# Patient Record
Sex: FEMALE | Race: NATIVE HAWAIIAN OR OTHER PACIFIC ISLANDER
[De-identification: names, ages, dates, MRNs, and addresses within clinical notes are randomized per-mention and may not be internally consistent; named-entity substitution may affect disease eponyms.]

---

## 2015-06-11 VITALS
DIASTOLIC BLOOD PRESSURE: 66 MMHG | SYSTOLIC BLOOD PRESSURE: 139 MMHG | SYSTOLIC BLOOD PRESSURE: 139 MMHG | DIASTOLIC BLOOD PRESSURE: 66 MMHG

## 2017-06-21 ENCOUNTER — HOSPITAL ENCOUNTER (OUTPATIENT)
Dept: HOSPITAL 67 - LABW | Age: 77
Discharge: HOME | End: 2017-06-21
Attending: INTERNAL MEDICINE
Payer: COMMERCIAL

## 2017-06-21 DIAGNOSIS — E78.4: Primary | ICD-10-CM

## 2017-06-21 LAB
LDLC SERPL-MCNC: 60 MG/DL (ref 0–?)
PLATELET # BLD: 192 K/UL (ref 152–353)
POTASSIUM SERPL-SCNC: 3.8 MMOL/L (ref 3.6–5.2)
SODIUM SERPL-SCNC: 139 MMOL/L (ref 136–145)

## 2017-06-21 PROCEDURE — 84443 ASSAY THYROID STIM HORMONE: CPT

## 2017-06-21 PROCEDURE — 81000 URINALYSIS NONAUTO W/SCOPE: CPT

## 2017-06-21 PROCEDURE — 80061 LIPID PANEL: CPT

## 2017-06-21 PROCEDURE — 36415 COLL VENOUS BLD VENIPUNCTURE: CPT

## 2017-06-21 PROCEDURE — 80053 COMPREHEN METABOLIC PANEL: CPT

## 2017-06-21 PROCEDURE — 85027 COMPLETE CBC AUTOMATED: CPT

## 2017-07-11 ENCOUNTER — HOSPITAL ENCOUNTER (OUTPATIENT)
Dept: HOSPITAL 24 - RAD | Age: 77
End: 2017-07-11
Attending: INTERNAL MEDICINE
Payer: COMMERCIAL

## 2017-07-11 DIAGNOSIS — Z12.31: Primary | ICD-10-CM

## 2017-07-11 DIAGNOSIS — I77.89: ICD-10-CM

## 2017-07-11 PROCEDURE — 93880 EXTRACRANIAL BILAT STUDY: CPT

## 2017-07-11 PROCEDURE — 77067 SCR MAMMO BI INCL CAD: CPT

## 2017-07-11 NOTE — VAS
HISTORY: Carotid arterial disease.



Study:  Carotid ultrasound.



Comparison: None available.



Technique: Multiple gray scale and color flow Doppler images of the right and left carotid arterial 
system were obtained.  The vertebral arterial system was evaluated as well.  



Findings:



Normal color flow Doppler is seen throughout the right and left carotid arterial system.  Calcific p
laque within the bilateral carotid bulbs. No hemodynamically significant stenosis is seen based on v
elocity criteria.  The right and left vertebral arteries demonstrate antegrade flow.



IMPRESSION:

 

1.  No hemodynamically significant stenosis.



2. Plaque burden as above.







Reported By:Electronically Signed by TASIA WU MD at 7/11/2017 3:23:44 PM

## 2017-07-12 NOTE — MG
HISTORY:  SCREENING 



Comparison: February and March of 2016 as well as a remote exam from 2006



FINDINGS: 



Bilateral CC and MLO projections of the right and left breast were obtained.  Heterogeneously dense 
fibroglandular tissue is seen to be present.  There is no new developing mass or architectural disto
rtion bilaterally. The previously described left breast nodule has resolved or nearly completely res
olved.  The previously described grouping of microcalcifications far posteriorly on the right superi
or to the plane of the nipple on the MLO view is incompletely evaluated. Additionally, there is a ne
w grouping of microcalcifications at approximately 11 o'clock at mid depth for which further evaluat
ion is also recommended. There is an additional stable grouping of benign course and dystrophic micr
ocalcifications at approximately 9 o'clock posteriorly. No skin thickening or nipple retraction is a
ppreciated.   No pathological lymphadenopathy can be identified. 

 

IMPRESSION:  2 groupings of right breast microcalcifications for which follow up spot magnification 
is recommended.  

 

ACR CATEGORY 0 - assessment incomplete; additional imaging recommended.

 

Diagnostic CAD was utilized and reviewed.



* 0 (ZERO) - ASSESSMENT INCOMPLETE; ADDITIONAL IMAGING IS NEEDED. 

* 1/1 (ONE) - NEGATIVE. 

* 2/II (TWO) - BENIGN FINDINGS.

 * 3/III (THREE) - PROBABLY BENIGN FINDING; SHORT INTERVAL FOLLOW-UP

SUGGESTED.

 * 4/IV (FOUR) - SUSPICIOUS ABNORMALITY; BIOPSY SHOULD BE CONSIDERED.

 * 5/V (FIVE) - HIGHLY SUSPICIOUS OF MALIGNANCY; BIOPSY SHOULD BE PERFORMED.

 

A NEGATIVE X-RAY REPORT SHOULD NOT DELAY BIOPSY IF A DOMINANT OR

CLINICALLY SUSPICIOUS MASS IS PRESENT; 4 TO 8 PERCENT OF CANCERS ARE NOT IDENTIFIED BY X-RAY.  A NEG
ATIVE REPORT MAY REINFORCE THE CLINICAL IMPRESSION.  ADENOSIS AND DENSE BREASTS MAY OBSCURE AN UNDER
LYING NEOPLASM.





Reported By:Electronically Signed by JAYLEN MEIER MD at 7/12/2017 10:31:56 AM

## 2017-08-01 ENCOUNTER — HOSPITAL ENCOUNTER (OUTPATIENT)
Dept: HOSPITAL 24 - RAD | Age: 77
End: 2017-08-01
Payer: COMMERCIAL

## 2017-08-01 DIAGNOSIS — R92.8: Primary | ICD-10-CM

## 2017-08-01 PROCEDURE — 77066 DX MAMMO INCL CAD BI: CPT

## 2017-08-01 NOTE — MG
HISTORY:  Abnormal screening mammography with new right breast microcalcifications



Right breast digital diagnostic mammography. 



Comparison: July 11, 2017 and February 18, 2016



FINDINGS: 



Spot magnification CC and MLO projections of the Right breast were obtained.  Heterogeneously dense 
fibroglandular tissue is seen to be present.  There is no mass or architectural distortion. The more
 anterior microcalcifications are predominantly course and dystrophic with a similar appearing stabl
e grouping more posteriorly most likely reflecting calcifying fibroadenomas, sclerosing adenosis, or
 fat necrosis and without suspicious fine linear branching pleomorphism.  The more posterior microca
lcifications only seen on the MLO view also demonstrates predominantly course and dystrophic forms b
ut with a few amorphous microcalcifications consistent with mild pleomorphism but again with probabl
y benign features for which 6 month follow up is recommended. No skin thickening or nipple retractio
n is appreciated.   No pathological lymphadenopathy can be identified. 

 

IMPRESSION:  Probably benign right breast microcalcifications for which 6 month follow up right arleen
st mammography is recommended.  

 

ACR CATEGORY 3 - probably benign findings; short interval followup suggested.

 

* 0 (ZERO) - ASSESSMENT INCOMPLETE; ADDITIONAL IMAGING IS NEEDED. 

* 1/1 (ONE) - NEGATIVE. 

* 2/II (TWO) - BENIGN FINDINGS.

 * 3/III (THREE) - PROBABLY BENIGN FINDING; SHORT INTERVAL FOLLOW-UP

SUGGESTED.

 * 4/IV (FOUR) - SUSPICIOUS ABNORMALITY; BIOPSY SHOULD BE CONSIDERED.

 * 5/V (FIVE) - HIGHLY SUSPICIOUS OF MALIGNANCY; BIOPSY SHOULD BE PERFORMED.

 

A NEGATIVE X-RAY REPORT SHOULD NOT DELAY BIOPSY IF A DOMINANT OR

CLINICALLY SUSPICIOUS MASS IS PRESENT; 4 TO 8 PERCENT OF CANCERS ARE NOT IDENTIFIED BY X-RAY.  A NEG
ATIVE REPORT MAY REINFORCE THE CLINICAL IMPRESSION.  ADENOSIS AND DENSE BREASTS MAY OBSCURE AN UNDER
LYING NEOPLASM.





Reported By:Electronically Signed by JAYLEN MEIER MD at 8/1/2017 10:53:44 AM

## 2017-09-16 ENCOUNTER — HOSPITAL ENCOUNTER (EMERGENCY)
Dept: HOSPITAL 67 - ED | Age: 77
Discharge: HOME | End: 2017-09-16
Payer: COMMERCIAL

## 2017-09-16 VITALS — HEIGHT: 63 IN | WEIGHT: 200 LBS | BODY MASS INDEX: 35.44 KG/M2

## 2017-09-16 VITALS — SYSTOLIC BLOOD PRESSURE: 169 MMHG | DIASTOLIC BLOOD PRESSURE: 70 MMHG | TEMPERATURE: 97.6 F

## 2017-09-16 DIAGNOSIS — L50.8: Primary | ICD-10-CM

## 2017-09-16 DIAGNOSIS — T78.49XA: ICD-10-CM

## 2017-09-16 DIAGNOSIS — R21: ICD-10-CM

## 2017-09-16 LAB
PLATELET # BLD: 211 K/UL (ref 152–353)
POTASSIUM SERPL-SCNC: 4.1 MMOL/L (ref 3.6–5.2)
SODIUM SERPL-SCNC: 136 MMOL/L (ref 136–145)

## 2017-09-16 PROCEDURE — 85651 RBC SED RATE NONAUTOMATED: CPT

## 2017-09-16 PROCEDURE — 81000 URINALYSIS NONAUTO W/SCOPE: CPT

## 2017-09-16 PROCEDURE — 99284 EMERGENCY DEPT VISIT MOD MDM: CPT

## 2017-09-16 PROCEDURE — 96374 THER/PROPH/DIAG INJ IV PUSH: CPT

## 2017-09-16 PROCEDURE — 36415 COLL VENOUS BLD VENIPUNCTURE: CPT

## 2017-09-16 PROCEDURE — 80048 BASIC METABOLIC PNL TOTAL CA: CPT

## 2017-09-16 PROCEDURE — 85027 COMPLETE CBC AUTOMATED: CPT

## 2018-07-12 ENCOUNTER — HOSPITAL ENCOUNTER (OUTPATIENT)
Dept: HOSPITAL 67 - LABW | Age: 78
Discharge: HOME | End: 2018-07-12
Attending: INTERNAL MEDICINE
Payer: COMMERCIAL

## 2018-07-12 DIAGNOSIS — Z79.899: ICD-10-CM

## 2018-07-12 DIAGNOSIS — E78.4: Primary | ICD-10-CM

## 2018-07-12 DIAGNOSIS — Z51.81: ICD-10-CM

## 2018-07-12 LAB
LDLC SERPL-MCNC: 92 MG/DL (ref 0–?)
PLATELET # BLD: 220 K/UL (ref 152–353)
POTASSIUM SERPL-SCNC: 4.5 MMOL/L (ref 3.6–5.2)
SODIUM SERPL-SCNC: 141 MMOL/L (ref 136–145)

## 2018-07-12 PROCEDURE — 81000 URINALYSIS NONAUTO W/SCOPE: CPT

## 2018-07-12 PROCEDURE — 36415 COLL VENOUS BLD VENIPUNCTURE: CPT

## 2018-07-12 PROCEDURE — 80061 LIPID PANEL: CPT

## 2018-07-12 PROCEDURE — 80053 COMPREHEN METABOLIC PANEL: CPT

## 2018-07-12 PROCEDURE — 85027 COMPLETE CBC AUTOMATED: CPT

## 2018-07-12 PROCEDURE — 84443 ASSAY THYROID STIM HORMONE: CPT

## 2018-09-18 ENCOUNTER — HOSPITAL ENCOUNTER (OUTPATIENT)
Dept: HOSPITAL 67 - LABW | Age: 78
Discharge: HOME | End: 2018-09-18
Attending: PHYSICIAN ASSISTANT
Payer: COMMERCIAL

## 2018-09-18 DIAGNOSIS — Z00.00: Primary | ICD-10-CM

## 2018-09-18 DIAGNOSIS — Z79.899: ICD-10-CM

## 2018-09-18 DIAGNOSIS — E55.9: ICD-10-CM

## 2018-09-18 PROCEDURE — 80076 HEPATIC FUNCTION PANEL: CPT

## 2018-09-18 PROCEDURE — 36415 COLL VENOUS BLD VENIPUNCTURE: CPT

## 2018-09-18 PROCEDURE — 82306 VITAMIN D 25 HYDROXY: CPT

## 2018-10-17 ENCOUNTER — HOSPITAL ENCOUNTER (OUTPATIENT)
Dept: HOSPITAL 67 - OR | Age: 78
Discharge: HOME | End: 2018-10-17
Attending: INTERNAL MEDICINE
Payer: COMMERCIAL

## 2018-10-17 DIAGNOSIS — K57.30: ICD-10-CM

## 2018-10-17 DIAGNOSIS — K62.1: ICD-10-CM

## 2018-10-17 DIAGNOSIS — K59.09: ICD-10-CM

## 2018-10-17 DIAGNOSIS — K64.8: ICD-10-CM

## 2018-10-17 DIAGNOSIS — R19.4: ICD-10-CM

## 2018-10-17 DIAGNOSIS — D12.0: ICD-10-CM

## 2018-10-17 DIAGNOSIS — D12.2: Primary | ICD-10-CM

## 2018-10-17 LAB
PLATELET # BLD: 237 K/UL (ref 152–353)
POTASSIUM SERPL-SCNC: 4 MMOL/L (ref 3.6–5.2)
SODIUM SERPL-SCNC: 142 MMOL/L (ref 136–145)

## 2018-10-17 PROCEDURE — 0DBH8ZZ EXCISION OF CECUM, VIA NATURAL OR ARTIFICIAL OPENING ENDOSCOPIC: ICD-10-PCS | Performed by: INTERNAL MEDICINE

## 2018-10-17 PROCEDURE — 0DBK8ZZ EXCISION OF ASCENDING COLON, VIA NATURAL OR ARTIFICIAL OPENING ENDOSCOPIC: ICD-10-PCS | Performed by: INTERNAL MEDICINE

## 2018-10-17 PROCEDURE — 85027 COMPLETE CBC AUTOMATED: CPT

## 2018-10-17 PROCEDURE — 0DBP8ZZ EXCISION OF RECTUM, VIA NATURAL OR ARTIFICIAL OPENING ENDOSCOPIC: ICD-10-PCS | Performed by: INTERNAL MEDICINE

## 2018-10-17 PROCEDURE — 80053 COMPREHEN METABOLIC PANEL: CPT

## 2018-12-11 ENCOUNTER — HOSPITAL ENCOUNTER (OUTPATIENT)
Dept: HOSPITAL 67 - US | Age: 78
Discharge: HOME | End: 2018-12-11
Attending: INTERNAL MEDICINE
Payer: COMMERCIAL

## 2018-12-11 DIAGNOSIS — R94.5: Primary | ICD-10-CM

## 2020-01-14 ENCOUNTER — HOSPITAL ENCOUNTER (OUTPATIENT)
Dept: HOSPITAL 67 - RAD | Age: 80
Discharge: HOME | End: 2020-01-14
Attending: REGISTERED NURSE
Payer: COMMERCIAL

## 2020-01-14 DIAGNOSIS — R06.09: Primary | ICD-10-CM

## 2022-01-14 ENCOUNTER — HOSPITAL ENCOUNTER (OUTPATIENT)
Dept: HOSPITAL 67 - RAD | Age: 82
Discharge: HOME | End: 2022-01-14
Attending: NURSE PRACTITIONER
Payer: COMMERCIAL

## 2022-01-14 DIAGNOSIS — M54.81: Primary | ICD-10-CM

## 2022-01-14 DIAGNOSIS — K76.0: ICD-10-CM

## 2022-01-14 LAB
PLATELET # BLD: 209 K/UL (ref 152–353)
POTASSIUM SERPL-SCNC: 5.2 MMOL/L (ref 3.6–5.2)
SODIUM SERPL-SCNC: 138 MMOL/L (ref 136–145)

## 2022-01-14 PROCEDURE — 36415 COLL VENOUS BLD VENIPUNCTURE: CPT

## 2022-01-14 PROCEDURE — 80053 COMPREHEN METABOLIC PANEL: CPT

## 2022-01-14 PROCEDURE — 85027 COMPLETE CBC AUTOMATED: CPT

## 2022-03-17 ENCOUNTER — HOSPITAL ENCOUNTER (OUTPATIENT)
Dept: HOSPITAL 67 - RAD | Age: 82
Discharge: HOME | End: 2022-03-17
Attending: REGISTERED NURSE
Payer: COMMERCIAL

## 2022-03-17 DIAGNOSIS — R06.02: Primary | ICD-10-CM

## 2022-05-03 ENCOUNTER — HOSPITAL ENCOUNTER (INPATIENT)
Dept: HOSPITAL 67 - MED/SURG | Age: 82
LOS: 1 days | Discharge: ANOTHER HOSPITAL | DRG: 282 | End: 2022-05-04
Attending: INTERNAL MEDICINE | Admitting: INTERNAL MEDICINE
Payer: COMMERCIAL

## 2022-05-03 VITALS — DIASTOLIC BLOOD PRESSURE: 44 MMHG | SYSTOLIC BLOOD PRESSURE: 122 MMHG | TEMPERATURE: 98.3 F

## 2022-05-03 VITALS — DIASTOLIC BLOOD PRESSURE: 32 MMHG | SYSTOLIC BLOOD PRESSURE: 121 MMHG | TEMPERATURE: 97.8 F

## 2022-05-03 VITALS — TEMPERATURE: 97.5 F | SYSTOLIC BLOOD PRESSURE: 119 MMHG | DIASTOLIC BLOOD PRESSURE: 37 MMHG

## 2022-05-03 VITALS — BODY MASS INDEX: 35.56 KG/M2 | WEIGHT: 193.25 LBS | BODY MASS INDEX: 35.56 KG/M2 | HEIGHT: 62 IN

## 2022-05-03 DIAGNOSIS — I48.0: ICD-10-CM

## 2022-05-03 DIAGNOSIS — R00.1: ICD-10-CM

## 2022-05-03 DIAGNOSIS — E78.49: ICD-10-CM

## 2022-05-03 DIAGNOSIS — R06.09: ICD-10-CM

## 2022-05-03 DIAGNOSIS — F03.90: ICD-10-CM

## 2022-05-03 DIAGNOSIS — I50.9: ICD-10-CM

## 2022-05-03 DIAGNOSIS — I11.0: ICD-10-CM

## 2022-05-03 DIAGNOSIS — I21.4: Primary | ICD-10-CM

## 2022-05-03 DIAGNOSIS — R63.4: ICD-10-CM

## 2022-05-03 DIAGNOSIS — H53.8: ICD-10-CM

## 2022-05-03 DIAGNOSIS — R53.1: ICD-10-CM

## 2022-05-03 LAB
PLATELET # BLD: 197 K/UL (ref 152–353)
POTASSIUM SERPL-SCNC: 4 MMOL/L (ref 3.6–5.2)
SODIUM SERPL-SCNC: 142 MMOL/L (ref 136–145)

## 2022-05-03 PROCEDURE — 85027 COMPLETE CBC AUTOMATED: CPT

## 2022-05-03 PROCEDURE — 93005 ELECTROCARDIOGRAM TRACING: CPT

## 2022-05-03 PROCEDURE — 84484 ASSAY OF TROPONIN QUANT: CPT

## 2022-05-03 PROCEDURE — 82805 BLOOD GASES W/O2 SATURATION: CPT

## 2022-05-03 PROCEDURE — 80053 COMPREHEN METABOLIC PANEL: CPT

## 2022-05-03 PROCEDURE — 81000 URINALYSIS NONAUTO W/SCOPE: CPT

## 2022-05-03 PROCEDURE — 82550 ASSAY OF CK (CPK): CPT

## 2022-05-03 PROCEDURE — 83880 ASSAY OF NATRIURETIC PEPTIDE: CPT

## 2022-05-03 PROCEDURE — 87635 SARS-COV-2 COVID-19 AMP PRB: CPT

## 2022-05-03 PROCEDURE — 84443 ASSAY THYROID STIM HORMONE: CPT

## 2022-05-03 PROCEDURE — 85730 THROMBOPLASTIN TIME PARTIAL: CPT

## 2022-05-03 PROCEDURE — U0003 INFECTIOUS AGENT DETECTION BY NUCLEIC ACID (DNA OR RNA); SEVERE ACUTE RESPIRATORY SYNDROME CORONAVIRUS 2 (SARS-COV-2) (CORONAVIRUS DISEASE [COVID-19]), AMPLIFIED PROBE TECHNIQUE, MAKING USE OF HIGH THROUGHPUT TECHNOLOGIES AS DESCRIBED BY CMS-2020-01-R: HCPCS

## 2022-05-03 PROCEDURE — 36600 WITHDRAWAL OF ARTERIAL BLOOD: CPT

## 2022-05-03 PROCEDURE — 94760 N-INVAS EAR/PLS OXIMETRY 1: CPT

## 2022-05-04 VITALS — DIASTOLIC BLOOD PRESSURE: 41 MMHG | TEMPERATURE: 98.2 F | SYSTOLIC BLOOD PRESSURE: 148 MMHG

## 2022-05-04 VITALS — SYSTOLIC BLOOD PRESSURE: 142 MMHG | DIASTOLIC BLOOD PRESSURE: 49 MMHG | TEMPERATURE: 97.8 F

## 2022-05-04 VITALS — TEMPERATURE: 98.1 F | SYSTOLIC BLOOD PRESSURE: 131 MMHG | DIASTOLIC BLOOD PRESSURE: 37 MMHG

## 2022-05-04 VITALS — TEMPERATURE: 98.6 F | DIASTOLIC BLOOD PRESSURE: 43 MMHG | SYSTOLIC BLOOD PRESSURE: 132 MMHG

## 2022-05-04 VITALS — TEMPERATURE: 98.4 F | DIASTOLIC BLOOD PRESSURE: 42 MMHG | SYSTOLIC BLOOD PRESSURE: 137 MMHG

## 2023-05-04 ENCOUNTER — HOSPITAL ENCOUNTER (OUTPATIENT)
Dept: HOSPITAL 67 - LABW | Age: 83
Discharge: HOME | End: 2023-05-04
Attending: REGISTERED NURSE
Payer: COMMERCIAL

## 2023-05-04 DIAGNOSIS — Z51.81: ICD-10-CM

## 2023-05-04 DIAGNOSIS — Z79.899: ICD-10-CM

## 2023-05-04 DIAGNOSIS — Z01.818: Primary | ICD-10-CM

## 2023-05-04 LAB
PLATELET # BLD: 220 K/UL (ref 152–353)
POTASSIUM SERPL-SCNC: 5.3 MMOL/L (ref 3.6–5.2)
SODIUM SERPL-SCNC: 142 MMOL/L (ref 136–145)

## 2023-05-04 PROCEDURE — 85027 COMPLETE CBC AUTOMATED: CPT

## 2023-05-04 PROCEDURE — 36415 COLL VENOUS BLD VENIPUNCTURE: CPT

## 2023-05-04 PROCEDURE — 80048 BASIC METABOLIC PNL TOTAL CA: CPT

## 2023-05-04 PROCEDURE — 85610 PROTHROMBIN TIME: CPT

## 2024-02-16 ENCOUNTER — HOSPITAL ENCOUNTER (OUTPATIENT)
Dept: HOSPITAL 67 - LABW | Age: 84
Discharge: HOME | End: 2024-02-16
Attending: NURSE PRACTITIONER
Payer: COMMERCIAL

## 2024-02-16 DIAGNOSIS — Z00.00: Primary | ICD-10-CM

## 2024-02-16 DIAGNOSIS — E55.9: ICD-10-CM

## 2024-02-16 DIAGNOSIS — E78.2: ICD-10-CM

## 2024-02-16 DIAGNOSIS — R73.03: ICD-10-CM

## 2024-02-16 DIAGNOSIS — R53.83: ICD-10-CM

## 2024-02-16 LAB
LDLC SERPL-MCNC: 58 MG/DL (ref 0–?)
PLATELET # BLD: 235 K/UL (ref 152–353)
POTASSIUM SERPL-SCNC: 4.1 MMOL/L (ref 3.6–5.2)
SODIUM SERPL-SCNC: 141 MMOL/L (ref 136–145)

## 2024-02-16 PROCEDURE — 82306 VITAMIN D 25 HYDROXY: CPT

## 2024-02-16 PROCEDURE — 84443 ASSAY THYROID STIM HORMONE: CPT

## 2024-02-16 PROCEDURE — 80061 LIPID PANEL: CPT

## 2024-02-16 PROCEDURE — 80053 COMPREHEN METABOLIC PANEL: CPT

## 2024-02-16 PROCEDURE — 36415 COLL VENOUS BLD VENIPUNCTURE: CPT

## 2024-02-16 PROCEDURE — 84439 ASSAY OF FREE THYROXINE: CPT

## 2024-02-16 PROCEDURE — 83036 HEMOGLOBIN GLYCOSYLATED A1C: CPT

## 2024-02-16 PROCEDURE — 82746 ASSAY OF FOLIC ACID SERUM: CPT

## 2024-02-16 PROCEDURE — 85027 COMPLETE CBC AUTOMATED: CPT

## 2024-02-16 PROCEDURE — 84481 FREE ASSAY (FT-3): CPT

## 2024-02-16 PROCEDURE — 82607 VITAMIN B-12: CPT
